# Patient Record
(demographics unavailable — no encounter records)

---

## 2024-11-08 NOTE — PHYSICAL EXAM
[No JVD] : no jugular venous distention [Regular Rhythm] : with a regular rhythm [Normal S1, S2] : normal S1 and S2 [Normal] : affect was normal and insight and judgment were intact

## 2024-11-08 NOTE — HISTORY OF PRESENT ILLNESS
[Coronary Artery Disease] : Coronary Artery Disease [Diabetes Mellitus] : Diabetes Mellitus [Hyperlipidemia] : Hyperlipidemia [Hypertension] : Hypertension [Obesity] : Obesity [FreeTextEntry6] : pt here notes has not been checking sugars and states bought himself a bag candy corn and eats nonstop and also eating pizza notes is aware does not need to eat more sugar. pt also worried states does not want to continue taking all pills he takes. Notes is taking 15 pills and that is too much  pt now here denies CP SOB palpitations or dizziness notes is doing well otherwise does forgets and friend brought him to today's visit.  Notes does not tell his sister he does not take all meds in am when he is going out and places in a cup instead and reuses it for filling up his medication box

## 2024-11-13 NOTE — REASON FOR VISIT
[Structural Heart and Valve Disease] : structural heart and valve disease [Coronary Artery Disease] : coronary artery disease [FreeTextEntry1] : Patient returns for follow-up.  He is generally doing well.  He has no chest pain.  He denies shortness of breath.  He does walk unassisted at this point.  Unfortunately he did not bring his medications with him.  He states he is having memory issues and does often forget his medications

## 2024-11-13 NOTE — ASSESSMENT
[FreeTextEntry1] : Impression: 1. Coronary disease status post recent percutaneous intervention 2.  Calcific aortic stenosis status post TAVR 3.  Left ventricular systolic dysfunction  Plan: 1.  For now continue current regimen and have urged compliance 2.  Have told patient to bring his medications with him to the next visit

## 2025-02-13 NOTE — DATA REVIEWED
[FreeTextEntry1] : went over results in chart as well as the A1c done today and went over medication but pt not certain what he is taking asked for me to call sister Kathy   I called and went over his medication list with his sister Kathy and also suggested he is always sleepy and falls asleep too easy worried. Also notes he is able to take most of his medications in am because she reminds him otherwise he does not take them. I will add sleep medicine devaluation and home sleep study

## 2025-02-13 NOTE — PHYSICAL EXAM
[No Acute Distress] : no acute distress [No JVD] : no jugular venous distention [Normal] : no respiratory distress, lungs were clear to auscultation bilaterally and no accessory muscle use [Normal S1, S2] : normal S1 and S2 [Soft] : abdomen soft [Non Tender] : non-tender [Non-distended] : non-distended [No CVA Tenderness] : no CVA  tenderness [No Spinal Tenderness] : no spinal tenderness [No Joint Swelling] : no joint swelling [Grossly Normal Strength/Tone] : grossly normal strength/tone [de-identified] : 5/5 strenght both lower extremiteies pain on rom left hip

## 2025-02-13 NOTE — PHYSICAL EXAM
[No Acute Distress] : no acute distress [No JVD] : no jugular venous distention [Normal] : no respiratory distress, lungs were clear to auscultation bilaterally and no accessory muscle use [Normal S1, S2] : normal S1 and S2 [Soft] : abdomen soft [Non Tender] : non-tender [Non-distended] : non-distended [No CVA Tenderness] : no CVA  tenderness [No Spinal Tenderness] : no spinal tenderness [No Joint Swelling] : no joint swelling [Grossly Normal Strength/Tone] : grossly normal strength/tone [de-identified] : 5/5 strenght both lower extremiteies pain on rom left hip

## 2025-02-13 NOTE — HISTORY OF PRESENT ILLNESS
[Congestive Heart Failure] : Congestive Heart Failure [Coronary Artery Disease] : Coronary Artery Disease [Diabetes Mellitus] : Diabetes Mellitus [Hyperlipidemia] : Hyperlipidemia [Hypertension] : Hypertension [Obesity] : Obesity [Other: ___] : [unfilled]: [No episodes] : No hypoglycemic episodes since the last visit. [Does not check] : Patient does not check blood glucose regularly [Understanding of foot care] : Patient expressed understanding of foot care [Does not check BP] : The patient is not checking blood pressure [Most Recent A1C: ___] : Most recent A1C was [unfilled] [Near target goal] : A1C near target goal [Moderate Intensity] : Patient is currently on moderate intensity statin  therapy [Stable] : Patient is stable [FreeTextEntry6] : 66 y/o here for f/u medical conditions and notes  is having issues with left leg does not go up and states right is solid strong but states even has issues putting on socks or pullups as well. Pt no CP SOB Notes is eating calzones and pizza most of the time and does goes to [employer] to get junk foods as well. Pt also noticed at times left legs not as strong as his right leg and sta [EyeExamDate] : today

## 2025-03-26 NOTE — REASON FOR VISIT
[Structural Heart and Valve Disease] : structural heart and valve disease [Coronary Artery Disease] : coronary artery disease [FreeTextEntry1] : .  Patient returns for follow-up..  He offers no complaints of chest discomfort.  He does state that he gets more short of breath than he had in the distant past.  He is still able to ambulate for 20 to 30 minutes.

## 2025-03-26 NOTE — ASSESSMENT
[FreeTextEntry1] : Impression: 1. Coronary disease status post recent percutaneous intervention 2.  Calcific aortic stenosis status post TAVR 3.  Left ventricular systolic dysfunction  Plan: 1.  For now continue current regimen and have urged compliance

## 2025-04-09 NOTE — ASSESSMENT
[FreeTextEntry1] : Post-cholecystectomy,  discussed changes in stool consistency and advised a gradual increase in diet while avoiding fatty foods.   The patient was reassured that medication for occasional loose stools is unnecessary, as dietary adjustments should suffice.   Metformin was temporarily held.  Will contact Sister Hien Pablo to review medications and reconcile dosages, as the patient was unable to provide this information. The patient was advised to maintain adequate hydration.   I am providing continuous care that includes testing, discussion of treatment options and shared decision making on diagnosis chosen treatment.

## 2025-04-09 NOTE — HISTORY OF PRESENT ILLNESS
[Post-hospitalization from ___ Hospital] : Post-hospitalization from [unfilled] Hospital [Admitted on: ___] : The patient was admitted on [unfilled] [Discharged on ___] : discharged on [unfilled] [Discharge Summary] : discharge summary [Pertinent Labs] : pertinent labs [Radiology Findings] : radiology findings [Discharge Med List] : discharge medication list [Med Reconciliation] : medication reconciliation has been completed [FreeTextEntry2] : 69yo M with PMHx of HTN, type 2 diabetes, CAD s/p stents, s/p TAVR, presenting to ED c/o could not get off his sofa today due to generalized weakness. Pt had ER visit the day prior for N&V that had since resolved. Pt was admitted for symptomatic cholelithiasis, had an MRCP performed. Patient was taken to the OR for laparoscopic cholecystectomy 4/3. Diet was advanced as tolerated. Blood cultures positive for klebsiella pneumoniae. Patient was treated with IV zosyn, Pt now here present for f/u post discharge nad notes after discharge stool has changed started with loose stools and now alternates at times it is loose and other is formed.  Notes never had abdominal pain even when in the hospital and not abdominal pain now.  Notes is taking all medications as well.  pt notes 2-3 BM per days some soft diarrhea other formed. Notes is eating better as well and wants something to stop the loose stools.

## 2025-04-09 NOTE — HISTORY OF PRESENT ILLNESS
[Post-hospitalization from ___ Hospital] : Post-hospitalization from [unfilled] Hospital [Admitted on: ___] : The patient was admitted on [unfilled] [Discharged on ___] : discharged on [unfilled] [Discharge Summary] : discharge summary [Pertinent Labs] : pertinent labs [Radiology Findings] : radiology findings [Discharge Med List] : discharge medication list [Med Reconciliation] : medication reconciliation has been completed [FreeTextEntry2] : 67yo M with PMHx of HTN, type 2 diabetes, CAD s/p stents, s/p TAVR, presenting to ED c/o could not get off his sofa today due to generalized weakness. Pt had ER visit the day prior for N&V that had since resolved. Pt was admitted for symptomatic cholelithiasis, had an MRCP performed. Patient was taken to the OR for laparoscopic cholecystectomy 4/3. Diet was advanced as tolerated. Blood cultures positive for klebsiella pneumoniae. Patient was treated with IV zosyn, Pt now here present for f/u post discharge nad notes after discharge stool has changed started with loose stools and now alternates at times it is loose and other is formed.  Notes never had abdominal pain even when in the hospital and not abdominal pain now.  Notes is taking all medications as well.  pt notes 2-3 BM per days some soft diarrhea other formed. Notes is eating better as well and wants something to stop the loose stools.

## 2025-04-09 NOTE — PHYSICAL EXAM
[Normal] : no acute distress, well nourished, well developed and well-appearing [No JVD] : no jugular venous distention [Normal S1, S2] : normal S1 and S2 [Soft] : abdomen soft [Non-distended] : non-distended [No CVA Tenderness] : no CVA  tenderness [No Spinal Tenderness] : no spinal tenderness [No Joint Swelling] : no joint swelling [de-identified] : scar healing with sore areas resoving hematomas [01303 - Moderate Complexity requires multiple possible diagnoses and/or the management options, moderate complexity of the medical data (tests, etc.) to be reviewed, and moderate risk of significant complications, morbidity, and/or mortality as well as co] : Moderate Complexity

## 2025-04-09 NOTE — COUNSELING
[Fall prevention counseling provided] : Fall prevention counseling provided
[Fall prevention counseling provided] : Fall prevention counseling provided
gradual onset

## 2025-04-09 NOTE — PHYSICAL EXAM
[Normal] : no acute distress, well nourished, well developed and well-appearing [No JVD] : no jugular venous distention [Normal S1, S2] : normal S1 and S2 [Soft] : abdomen soft [Non-distended] : non-distended [No CVA Tenderness] : no CVA  tenderness [No Spinal Tenderness] : no spinal tenderness [No Joint Swelling] : no joint swelling [de-identified] : scar healing with sore areas resoving hematomas [89663 - Moderate Complexity requires multiple possible diagnoses and/or the management options, moderate complexity of the medical data (tests, etc.) to be reviewed, and moderate risk of significant complications, morbidity, and/or mortality as well as co] : Moderate Complexity

## 2025-04-10 NOTE — HISTORY OF PRESENT ILLNESS
[de-identified] : s/p laparoscopic cholecystectomy April 3, 2025 doing well, no complaints occasional loose stools

## 2025-04-10 NOTE — PHYSICAL EXAM
[Calm] : calm [de-identified] : looks well [de-identified] : sclera anicteric [de-identified] : wounds clean and dry, soft, non distended, non tender no trocar site hernias

## 2025-05-06 NOTE — PHYSICAL EXAM
[de-identified] : looks well [de-identified] : sclera anicteric [de-identified] : benign, no trocar site hernias on valsalva maneuvers

## 2025-05-19 NOTE — HISTORY OF PRESENT ILLNESS
[de-identified] : This 68 year old man has a history of colonic polyps. He last underwent a colonoscopy over seven years ago. There is no FH of colon CA.

## 2025-05-19 NOTE — PHYSICAL EXAM
[Normal Breath Sounds] : Normal breath sounds [Normal Heart Sounds] : normal heart sounds [Normal Rate and Rhythm] : normal rate and rhythm [Abdominal Masses] : No abdominal masses [Abdomen Tenderness] : ~T ~M No abdominal tenderness [No Rash or Lesion] : No rash or lesion [de-identified] : nl [de-identified] : nl [de-identified] : nl

## 2025-05-19 NOTE — ASSESSMENT
[FreeTextEntry1] : Discussion regarding all options and risks Bowel prep written and explained Scheduled for colonoscopy on 6/24/25 All lab values and imaging studies reviewed Discussed with Medicine

## 2025-05-28 NOTE — PHYSICAL EXAM
[Normal Sclera/Conjunctiva] : normal sclera/conjunctiva [PERRL] : pupils equal round and reactive to light [EOMI] : extraocular movements intact [Normal Outer Ear/Nose] : the outer ears and nose were normal in appearance [Normal Oropharynx] : the oropharynx was normal [Normal TMs] : both tympanic membranes were normal [No JVD] : no jugular venous distention [Regular Rhythm] : with a regular rhythm [Normal S1, S2] : normal S1 and S2 [Normal] : no posterior cervical lymphadenopathy and no anterior cervical lymphadenopathy [No Joint Swelling] : no joint swelling [Grossly Normal Strength/Tone] : grossly normal strength/tone [Coordination Grossly Intact] : coordination grossly intact [Normal Affect] : the affect was normal [Normal Insight/Judgement] : insight and judgment were intact [de-identified] : AS murmur present [de-identified] : 5/5 on exam both lower extremities

## 2025-05-28 NOTE — ASSESSMENT
[Vaccines Reviewed] : Immunizations reviewed today. Please see immunization details in the vaccine log within the immunization flowsheet.  [FreeTextEntry1] :  I am providing continuous care that includes testing, discussion of treatment options and shared decision making on diagnosis chosen treatment.

## 2025-05-28 NOTE — HEALTH RISK ASSESSMENT
[Very Good] : ~his/her~  mood as very good [Intercurrent hospitalizations] : was admitted to the hospital  [No] : No [No falls in past year] : Patient reported no falls in the past year [0] : 2) Feeling down, depressed, or hopeless: Not at all (0) [Yes] : takes [Lack of understanding] : lack of understanding [Other ___] : [unfilled] [Never] : Never [NO] : No [HIV test declined] : HIV test declined [Hepatitis C test declined] : Hepatitis C test declined [Transportation] : transportation [With Family] : lives with family [Retired] : retired [Single] : single [# Of Children ___] : has [unfilled] children [Fully functional (bathing, dressing, toileting, transferring, walking, feeding)] : Fully functional (bathing, dressing, toileting, transferring, walking, feeding) [Independent] : using telephone [Some assistance needed] : doing laundry [Full assistance needed] : managing finances [Name: ___] : Health Care Proxy's Name: [unfilled]  [Relationship: ___] : Relationship: [unfilled] [I will adhere to the patient's wishes.] : I will adhere to the patient's wishes. [FreeTextEntry1] : gallbladder [de-identified] : gallstones [de-identified] : none in over one year [Change in mental status noted] : No change in mental status noted [Sexually Active] : not sexually active [de-identified] : family and frineds help him with transportation and sister assits with medication preparation  [de-identified] : sister Luna [de-identified] : Worked at [employer] for over 17 years and did [employer] work  [FreeTextEntry2] : siter does shopping [FreeTextEntry6] : friend helps  [FreeTextEntry7] : sister Luna [FreeTextEntry8] : Roni manages

## 2025-05-28 NOTE — DATA REVIEWED
[FreeTextEntry1] : went over results and medications with patient and tried to call sister Luna but phone rings and memory is full not able to leave message

## 2025-05-28 NOTE — COUNSELING
[Fall prevention counseling provided] : Fall prevention counseling provided [Adequate lighting] : Adequate lighting [No throw rugs] : No throw rugs [Use proper foot wear] : Use proper foot wear [Behavioral health counseling provided] : Behavioral health counseling provided

## 2025-05-28 NOTE — HISTORY OF PRESENT ILLNESS
[de-identified] : 67 y/o PMHX DM2 HTN HLD CAD CHF obesity here for annual CPE Notes needs to get audiology referral but notes also needs to see specialist since his left legs gives out and wants to go to neurology to see why his legs is off. pt  pt denies back pain but notes at timers when walks  has big hill and gets chest pain when goes up the hill. Pt no fallen due to leg issues but feels walking is bad sewcondary to LLE issues

## 2025-07-21 NOTE — REVIEW OF SYSTEMS
[Memory Loss] : memory loss [Negative] : Respiratory [Fever] : no fever [Fatigue] : no fatigue [Chest Pain] : no chest pain [Palpitations] : no palpitations [Lower Ext Edema] : no lower extremity edema [Orthopena] : no orthopnea [Abdominal Pain] : no abdominal pain [Nausea] : no nausea [Vomiting] : no vomiting [Dysuria] : no dysuria [Dizziness] : no dizziness [Fainting] : no fainting

## 2025-07-21 NOTE — HISTORY OF PRESENT ILLNESS
[Post-hospitalization from ___ Hospital] : Post-hospitalization from [unfilled] Hospital [Admitted on: ___] : The patient was admitted on [unfilled] [Discharged on ___] : discharged on [unfilled] [Discharge Summary] : discharge summary [Pertinent Labs] : pertinent labs [Radiology Findings] : radiology findings [Discharge Med List] : discharge medication list [Med Reconciliation] : medication reconciliation has been completed [FreeTextEntry2] : 69 y/o M with PMH of HTN, type 2 diabetes, MI (8-10 years ago), CAD s/p stents (most recent stent 6/2024) and CABG, ischemic cardiomyopathy, s/p TAVR, went to the ED for chest pain admitted for r/o ACS  EKG: NSR withHR 73 bpm, normal intervals, no ST changes, no T wave inversions unchanged from prior. TTE unchanged. Troponin x3 negative. 7/14 stress test abnormal. Plan for transfer to [place] for [facility]. Today, patient unsure of whether or not he received a stent. He is accompanied by his sister. They are confused about some of the medications. Hospital discharge paper work reviewed and showed: Currently taking: metoformin 1000 mg BID atorvastatin 40 mg daily prasugrel 10 mg daily aspirin 81 mg ranolazine 500 mg BID losartan 50 mg daily levothyroxine 75 mcg daily pantoprazole 40 mg daily jardiance 10 mg daily metoprolol 50 mg daily Patient is currently feeling all right. He was told to follow up with cardiac rehab at [place], but he does not have transportation to get there. He plans to follow up with his outpatient cardiologist, Dr. Torres. According to [place] discharge paperwork and patient's sister, he had abnormalities on thyroid testing that need to be followed up.

## 2025-07-21 NOTE — PHYSICAL EXAM
[No Acute Distress] : no acute distress [No JVD] : no jugular venous distention [No Lymphadenopathy] : no lymphadenopathy [Thyroid Normal, No Nodules] : the thyroid was normal and there were no nodules present [No Carotid Bruits] : no carotid bruits [Pedal Pulses Present] : the pedal pulses are present [No Edema] : there was no peripheral edema [Normal] : soft, non-tender, non-distended, no masses palpated, no HSM and normal bowel sounds [Normal Posterior Cervical Nodes] : no posterior cervical lymphadenopathy [Normal Anterior Cervical Nodes] : no anterior cervical lymphadenopathy [No Focal Deficits] : no focal deficits [Alert and Oriented x3] : oriented to person, place, and time [22511 - High Complexity requires an extensive number of possible diagnoses and/or the management options, extensive complexity of the medical data (tests, etc.) to be reviewed, and a high risk of significant complications, morbidity, and/or mortality as w] : High Complexity

## 2025-07-21 NOTE — HISTORY OF PRESENT ILLNESS
[Post-hospitalization from ___ Hospital] : Post-hospitalization from [unfilled] Hospital [Admitted on: ___] : The patient was admitted on [unfilled] [Discharged on ___] : discharged on [unfilled] [Discharge Summary] : discharge summary [Pertinent Labs] : pertinent labs [Radiology Findings] : radiology findings [Discharge Med List] : discharge medication list [Med Reconciliation] : medication reconciliation has been completed [FreeTextEntry2] : 69 y/o M with PMH of HTN, type 2 diabetes, MI (8-10 years ago), CAD s/p stents (most recent stent 6/2024) and CABG, ischemic cardiomyopathy, s/p TAVR, went to the ED for chest pain admitted for r/o ACS  EKG: NSR withHR 73 bpm, normal intervals, no ST changes, no T wave inversions unchanged from prior. TTE unchanged. Troponin x3 negative. 7/14 stress test abnormal. Plan for transfer to [place] for [facility]. Today, patient unsure of whether or not he received a stent. He is accompanied by his sister. They are confused about some of the medications. Hospital discharge paper work reviewed and showed: Currently taking: metoformin 1000 mg BID atorvastatin 40 mg daily prasugrel 10 mg daily aspirin 81 mg ranolazine 500 mg BID losartan 50 mg daily levothyroxine 75 mcg daily pantoprazole 40 mg daily jardiance 10 mg daily metoprolol 50 mg daily Patient is currently feeling all right. He was told to follow up with cardiac rehab at [place], but he does not have transportation to get there. He plans to follow up with his outpatient cardiologist, Dr. Torres. According to [place] discharge paperwork and patient's sister, he had abnormalities on thyroid testing that need to be followed up.

## 2025-07-21 NOTE — PHYSICAL EXAM
[No Acute Distress] : no acute distress [No JVD] : no jugular venous distention [No Lymphadenopathy] : no lymphadenopathy [Thyroid Normal, No Nodules] : the thyroid was normal and there were no nodules present [No Carotid Bruits] : no carotid bruits [Pedal Pulses Present] : the pedal pulses are present [No Edema] : there was no peripheral edema [Normal] : soft, non-tender, non-distended, no masses palpated, no HSM and normal bowel sounds [Normal Posterior Cervical Nodes] : no posterior cervical lymphadenopathy [Normal Anterior Cervical Nodes] : no anterior cervical lymphadenopathy [No Focal Deficits] : no focal deficits [Alert and Oriented x3] : oriented to person, place, and time [08828 - High Complexity requires an extensive number of possible diagnoses and/or the management options, extensive complexity of the medical data (tests, etc.) to be reviewed, and a high risk of significant complications, morbidity, and/or mortality as w] : High Complexity

## 2025-07-21 NOTE — HISTORY OF PRESENT ILLNESS
[Post-hospitalization from ___ Hospital] : Post-hospitalization from [unfilled] Hospital [Admitted on: ___] : The patient was admitted on [unfilled] [Discharged on ___] : discharged on [unfilled] [Discharge Summary] : discharge summary [Pertinent Labs] : pertinent labs [Radiology Findings] : radiology findings [Discharge Med List] : discharge medication list [Med Reconciliation] : medication reconciliation has been completed [FreeTextEntry2] : 69 y/o M with PMH of HTN, type 2 diabetes, MI (8-10 years ago), CAD s/p stents (most recent stent 6/2024) and CABG, ischemic cardiomyopathy, s/p TAVR, went to the ED for chest pain admitted for r/o ACS  EKG: NSR withHR 73 bpm, normal intervals, no ST changes, no T wave inversions unchanged from prior. TTE unchanged. Troponin x3 negative. 7/14 stress test abnormal. Plan for transfer to [place] for [facility]. Today, patient unsure of whether or not he received a stent. He is accompanied by his sister. They are confused about some of the medications. Hospital discharge paper work reviewed and showed: Currently taking: metoformin 1000 mg BID atorvastatin 40 mg daily prasugrel 10 mg daily aspirin 81 mg ranolazine 500 mg BID losartan 50 mg daily levothyroxine 75 mcg daily pantoprazole 40 mg daily jardiance 10 mg daily metoprolol 50 mg daily Patient is currently feeling all right. He was told to follow up with cardiac rehab at [place], but he does not have transportation to get there. He plans to follow up with his outpatient cardiologist, Dr. Torres. According to [place] discharge paperwork and patient's sister, he had abnormalities on thyroid testing that need to be followed up.

## 2025-07-21 NOTE — PHYSICAL EXAM
[No Acute Distress] : no acute distress [No JVD] : no jugular venous distention [No Lymphadenopathy] : no lymphadenopathy [Thyroid Normal, No Nodules] : the thyroid was normal and there were no nodules present [No Carotid Bruits] : no carotid bruits [Pedal Pulses Present] : the pedal pulses are present [No Edema] : there was no peripheral edema [Normal] : soft, non-tender, non-distended, no masses palpated, no HSM and normal bowel sounds [Normal Posterior Cervical Nodes] : no posterior cervical lymphadenopathy [Normal Anterior Cervical Nodes] : no anterior cervical lymphadenopathy [No Focal Deficits] : no focal deficits [Alert and Oriented x3] : oriented to person, place, and time [88228 - High Complexity requires an extensive number of possible diagnoses and/or the management options, extensive complexity of the medical data (tests, etc.) to be reviewed, and a high risk of significant complications, morbidity, and/or mortality as w] : High Complexity